# Patient Record
Sex: FEMALE | Race: WHITE | NOT HISPANIC OR LATINO
[De-identification: names, ages, dates, MRNs, and addresses within clinical notes are randomized per-mention and may not be internally consistent; named-entity substitution may affect disease eponyms.]

---

## 2020-10-26 ENCOUNTER — RESULT REVIEW (OUTPATIENT)
Age: 30
End: 2020-10-26

## 2022-03-23 ENCOUNTER — RESULT REVIEW (OUTPATIENT)
Age: 32
End: 2022-03-23

## 2022-04-18 ENCOUNTER — APPOINTMENT (OUTPATIENT)
Dept: PLASTIC SURGERY | Facility: CLINIC | Age: 32
End: 2022-04-18
Payer: MEDICAID

## 2022-04-18 VITALS
TEMPERATURE: 97.4 F | SYSTOLIC BLOOD PRESSURE: 128 MMHG | OXYGEN SATURATION: 99 % | HEIGHT: 62 IN | WEIGHT: 145 LBS | BODY MASS INDEX: 26.68 KG/M2 | DIASTOLIC BLOOD PRESSURE: 82 MMHG | HEART RATE: 94 BPM

## 2022-04-18 DIAGNOSIS — N62 HYPERTROPHY OF BREAST: ICD-10-CM

## 2022-04-18 PROBLEM — Z00.00 ENCOUNTER FOR PREVENTIVE HEALTH EXAMINATION: Status: ACTIVE | Noted: 2022-04-18

## 2022-04-18 PROCEDURE — 99203 OFFICE O/P NEW LOW 30 MIN: CPT

## 2022-04-19 PROBLEM — N62 MACROMASTIA: Status: ACTIVE | Noted: 2022-04-19

## 2022-04-20 NOTE — PHYSICAL EXAM
[NI] : Normal [de-identified] : NAD, AxOx3 [de-identified] : nonlabored breathing  [de-identified] : normal HR [de-identified] : Bilateral breasts- no masses, no nipple discharge or retraction. There is breast ptosis. There is asymmetry with right >left. There is no skin breakdown or irritation noted \par \par RIGHT:\par SN-N 27.5\par N-IMF 12\par BW 13\par \par LEFT:\par SN-N 27.5\par N-IF 12.5\par BW 13 [de-identified] : no edema

## 2022-04-20 NOTE — REVIEW OF SYSTEMS
[Negative] : Heme/Lymph [Fever] : no fever [Chills] : no chills [FreeTextEntry9] : back neck and shoulder pain

## 2022-04-20 NOTE — HISTORY OF PRESENT ILLNESS
[FreeTextEntry1] : GIO HILL is a 31 year F presenting with symptomatic macromastia, requesting consultation for breast reduction surgery. Patient states that she suffers from long standing neck, back and shoulder pain, with bra strap grooving from her heavy breasts. She admits to frequent rashes under her breasts without any relief from creams/lotions. She does admits to seeing a chiropractor to help alleviate her back pain. Patient states her heavy breasts have been interfering with her activities of daily living for over 10 years.\par Patient is currently postpartum 3 months. She does state she wishes to lose 20 more pounds..\par PMHx- denies\par PSHx- denies \par Allergies- penicllin (hives)\par \par She currently wears a bra size 34DD, wishes for small "C"\par \par She denies any personal or family history of breast cancer. She denies any breast surgery in the past\par She denies any history of blood clots. She does not take any anticoagulants or blood thinners. \par \par She admits to occasional tobacco use. \par She works from home.\par \par \par \par \par

## 2022-06-27 ENCOUNTER — APPOINTMENT (OUTPATIENT)
Dept: PLASTIC SURGERY | Facility: CLINIC | Age: 32
End: 2022-06-27

## 2024-12-04 ENCOUNTER — NON-APPOINTMENT (OUTPATIENT)
Age: 34
End: 2024-12-04

## 2024-12-08 ENCOUNTER — NON-APPOINTMENT (OUTPATIENT)
Age: 34
End: 2024-12-08